# Patient Record
Sex: FEMALE | Race: WHITE | NOT HISPANIC OR LATINO | URBAN - METROPOLITAN AREA
[De-identification: names, ages, dates, MRNs, and addresses within clinical notes are randomized per-mention and may not be internally consistent; named-entity substitution may affect disease eponyms.]

---

## 2017-07-24 ENCOUNTER — OFFICE VISIT - RIVER FALLS (OUTPATIENT)
Dept: FAMILY MEDICINE | Facility: CLINIC | Age: 47
End: 2017-07-24

## 2017-07-26 LAB
CREAT SERPL-MCNC: 0.83 MG/DL (ref 0.5–1.1)
GLUCOSE BLD-MCNC: 87 MG/DL (ref 65–99)

## 2018-08-14 ENCOUNTER — OFFICE VISIT - RIVER FALLS (OUTPATIENT)
Dept: FAMILY MEDICINE | Facility: CLINIC | Age: 48
End: 2018-08-14

## 2018-08-14 ENCOUNTER — AMBULATORY - RIVER FALLS (OUTPATIENT)
Dept: FAMILY MEDICINE | Facility: CLINIC | Age: 48
End: 2018-08-14

## 2018-08-14 ASSESSMENT — MIFFLIN-ST. JEOR: SCORE: 1264.97

## 2018-08-15 LAB
CREAT SERPL-MCNC: 0.88 MG/DL (ref 0.5–1.1)
GLUCOSE BLD-MCNC: 91 MG/DL (ref 65–99)

## 2022-02-11 VITALS
DIASTOLIC BLOOD PRESSURE: 90 MMHG | SYSTOLIC BLOOD PRESSURE: 146 MMHG | HEART RATE: 76 BPM | WEIGHT: 152 LBS | BODY MASS INDEX: 26.93 KG/M2

## 2022-02-11 VITALS
SYSTOLIC BLOOD PRESSURE: 122 MMHG | HEIGHT: 63 IN | BODY MASS INDEX: 26.79 KG/M2 | TEMPERATURE: 97.4 F | DIASTOLIC BLOOD PRESSURE: 86 MMHG | HEART RATE: 64 BPM | WEIGHT: 151.2 LBS

## 2022-02-16 NOTE — PROGRESS NOTES
Patient:   DONALDO HUTCHINSON            MRN: [id number]            FIN: [id number]               Age:   48 years     Sex:  Female     :  1970   Associated Diagnoses:   Hypertensive disorder   Author:   Aubree Loomis      Chief Complaint   2018 8:16 AM CDT    HTN med check/refill        History of Present Illness   confirmed chief complaint with patient  She is doing great  needs BMP for hx of HTN despite efforts at diet and exercise and wt control  She runs low potassium at times and has OTC supplement she takes prn when she feels 'low energy'; level normal last year  BP controlled  non smoker  will schedule mammogram         Review of Systems             Health Status   Allergies:    Allergic Reactions (Selected)  No known allergies   Medications:  (Selected)   Prescriptions  Prescribed  chlorthalidone 25 mg oral tablet: See Instructions, Instructions: TAKE 1/2 TABLET BY MOUTH DAILY, # 45 tab(s), 4 Refill(s), Type: Maintenance, Pharmacy: [facility] [id number], TAKE 1/2 TABLET BY MOUTH DAILY  lisinopril 10 mg oral tablet: 1 tab(s) ( 10 mg ), Oral, daily, # 90 tab(s), 4 Refill(s), Type: Maintenance, Pharmacy: [facility] [id number], 1 tab(s) Oral daily  Documented Medications  Documented  ParaGard intrauteral device: Instructions: placed 8 years ago, 0 Refill(s), Type: Maintenance   Problem list:    All Problems  Hypertensive disorder / SNOMED CT 8110214153 / Confirmed  Toxic shock syndrome / SNOMED CT 0218494589 / Confirmed  Allergic rhinitis / SNOMED CT 938373132 / Confirmed  Adopted / SNOMED CT 498232622 / Confirmed  Resolved: Pregnancy / SNOMED CT 165789202  Resolved: Pregnancy / SNOMED CT 029538701  Resolved: Pregnancy / SNOMED CT 990050098      Histories   Past Medical History:    Active  Toxic shock syndrome (7357908895): Onset in  at 19 years.  Hypertensive disorder (3421926694)  Allergic rhinitis (865978843)  Adopted (261224392)  Resolved  Pregnancy (459179690): Onset on 2003  at 33 years.  Resolved on 3/12/2004 at 33 years.  Pregnancy (936156576): Onset on 1/9/1999 at 28 years.  Resolved on 10/2/1999 at 29 years.  Pregnancy (542770624): Onset on 9/25/1995 at 25 years.  Resolved on 4/1/1996 at 26 years.   Family History:    Patient was adopted.   High blood pressure  Mother  Grandmother (P)  Diabetes..  Mother  Grandfather (M)     Procedure history:    Bilateral reduction mammoplasty (SNOMED CT 052960273) performed by Pete aCstro MD on 10/3/2016 at 46 Years.  Dilation and curettage (SNOMED CT 14414465) in 1986 at 16 Years.  Comments:  9/22/2016 11:11 AM - Stacy Soria  With miscarriage.  Arthroscopy of knee (SNOMED CT 159361220) in 1985 at 15 Years.  Comments:  9/22/2016 11:21 AM Stacy Nam  Right knee reconstruction.  Right knee surgery in 1983 at 13 Years.  Comments:  9/22/2016 11:13 Stacy Wilkerson  x 2  Adenoidectomy (SNOMED CT 071921094).   Social History:        Alcohol Assessment            Current, 3-5 times per week, 3 drinks/episode average.      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  Spouse/Partner name: Ministerio.  2 children.      Exercise and Physical Activity Assessment            Exercise frequency: 5-6 times/week.  Exercise type: Running, Walking.                     Comments:                      01/22/2016 - Hanane Coreas RN      Sexual Assessment            Sexually active: Yes.  Sexual orientation: Heterosexual.  Uses condoms: No.  Contraceptive Use Details:               Intrauterine device.        Physical Examination   Vital Signs   8/14/2018 8:16 AM CDT Temperature Tympanic 97.4 DegF  LOW    Peripheral Pulse Rate 64 bpm    Pulse Site Radial artery    HR Method  Manual    Systolic Blood Pressure 122 mmHg    Diastolic Blood Pressure 86 mmHg  HI    Mean Arterial Pressure 98 mmHg    BP Site Right arm    BP Method Manual      Measurements from flowsheet : Measurements   8/14/2018 8:16 AM CDT Height Measured - Standard 63 in    Weight Measured - Standard 151.2 lb    BSA 1.74 m2    Body Mass Index 26.78 kg/m2  HI      General:  Alert and oriented, No acute distress.    Neck:  Supple, Non-tender, No lymphadenopathy, No thyromegaly.    Respiratory:  Lungs are clear to auscultation, Respirations are non-labored, Breath sounds are equal.    Cardiovascular:  Normal rate, Regular rhythm, No murmur, Normal peripheral perfusion, No edema.    Integumentary:  Warm, Dry, Pink.       Impression and Plan   Diagnosis     Hypertensive disorder ([id number]).     Patient Instructions:       Counseled: Patient, Regarding diagnosis, Regarding treatment, Regarding medications, Verbalized understanding.    Orders     Orders (Selected)   Outpatient Orders  Ordered (Dispatched)  Basic Metabolic Panel* ([facility]): Specimen Type: Serum, Collection Date: 08/14/18 8:40:00 CDT  Prescriptions  Prescribed  chlorthalidone 25 mg oral tablet: See Instructions, Instructions: TAKE 1/2 TABLET BY MOUTH DAILY, # 45 tab(s), 4 Refill(s), Type: Maintenance, Pharmacy: [id number] [id number], TAKE 1/2 TABLET BY MOUTH DAILY  lisinopril 10 mg oral tablet: 1 tab(s) ( 10 mg ), Oral, daily, # 90 tab(s), 4 Refill(s), Type: Maintenance, Pharmacy: [facility] [id number], 1 tab(s) Oral daily.

## 2022-02-16 NOTE — LETTER
(Inserted Image. Unable to display)       September 05, 2019      DONALDO HUTCHINSON  [address] [place], [place] [id number]          Dear DONALDO,      Thank you for selecting [facility] for your healthcare needs.     Our records indicate you are due for the following services:    Fasting Lab Tests ~ Please do not eat or drink anything 10 hours prior to your scheduled appointment time.  (Water and any medications that you may need are allowed unless directed otherwise.)    If you had your labs done at another facility or with [facility] Lab Testing at [facility], please bring in a copy of the results to your next visit, mail a copy, or drop off a copy of your results to your Healthcare Provider.    Medication Check  Hypertension Check ~ Please remember to bring any at-home blood pressure readings with you to your appointment.    You are due for lab work and an office visit; please schedule the lab appointment 1 week before the office visit.  This will assure all results are available to discuss with your Healthcare Provider during your visit.    **It is very helpful if you bring your medication bottles to your appointment.  This assures we have all of your current medications, including strength and dosing information, documented accurately in your medical record.    To schedule an appointment or if you have further questions, please contact your primary clinic:   [facility]       [phone]   [facility]       [phone]              [facility]     [phone]    Powered by [employer] and [employer]    Sincerely,    JOSE Blackmon

## 2022-02-16 NOTE — TELEPHONE ENCOUNTER
Entered by Yesica Bedolla CMA on September 03, 2019 12:31:11 PM CDT  Date of last office visit and reason:  8/14/18 HTN      Date of last Med Check / Px:   _  Date of last labs pertaining to med:  BMP 8/14/18     RTC order in chart:  Due back last month. RTC placed. One month protocol today    For Protocol refill, has patient been contacted:  Letter/Message      ------------------------------------------  From: [facility] #[id number]  To: Aubree Loomis  Sent: August 29, 2019 7:46:03 PM CDT  Subject: Medication Management  Due: August 30, 2019 7:46:03 PM CDT    ** On Hold Pending Signature **  Drug: chlorthalidone (chlorthalidone 25 mg oral tablet)  TAKE 1/2 TABLET BY MOUTH DAILY  Quantity: 45 unknown unit  Days Supply: 0         Refills: 3  Substitutions Allowed  Notes from Pharmacy:     Dispensed Drug: chlorthalidone (chlorthalidone 25 mg oral tablet)  TAKE 1/2 TABLET BY MOUTH DAILY  Quantity: 45 tab(s)     Days Supply: 90        Refills: 0  Substitutions Allowed  Notes from Pharmacy:     ** On Hold Pending Signature **  Drug: lisinopril (lisinopril 10 mg oral tablet)  1 TAB(S) ORAL DAILY  Quantity: 90 unknown unit  Days Supply: 0         Refills: 3  Substitutions Allowed  Notes from Pharmacy:     Dispensed Drug: lisinopril (lisinopril 10 mg oral tablet)  TAKE 1 TABLET BY MOUTH DAILY  Quantity: 90 tab(s)     Days Supply: 90        Refills: 0  Substitutions Allowed  Notes from Pharmacy:   ------------------------------------------  ** Submitted: **  Order:chlorthalidone (chlorthalidone 25 mg oral tablet)  0.5 tab(s)  Oral  daily  Qty:  15 tab(s)        Refills:  0          Substitutions Allowed     Route To Pharmacy - [facility] #[id number]    Signed by Yesica Bedolla CMA  9/3/2019 12:31:00 PM    ** Submitted: **  Complete:chlorthalidone (chlorthalidone 25 mg oral tablet)   Signed by Yesica Bedolla CMA  9/3/2019 12:31:00 PM    ** Not Approved:  **  chlorthalidone (CHLORTHALIDONE 25MG TABLETS)  TAKE  1/2 TABLET BY MOUTH DAILY  Qty:  45 tab(s)        Days Supply:  90        Refills:  0          Substitutions Allowed     Route To [facility] #[id number]   Signed by Yesica Bedolla CMA---------------------  From: Yesica Bedolla CMA   To: [facility] #[id number]    Sent: 9/3/2019 12:31:53 PM CDT  Subject: Medication Management     ** Submitted: **  Order:lisinopril (lisinopril 10 mg oral tablet)  1 tab(s)  Oral  daily  Qty:  30 tab(s)        Refills:  0          Substitutions Allowed     Route To [facility] #[id number]    Signed by Yesica Bedolla CMA  9/3/2019 12:31:00 PM    ** Submitted: **  Complete:lisinopril (lisinopril 10 mg oral tablet)   Signed by Yesica Bedolla CMA  9/3/2019 12:31:00 PM    ** Not Approved:  **  lisinopril (LISINOPRIL 10MG TABLETS)  TAKE 1 TABLET BY MOUTH DAILY  Qty:  90 tab(s)        Days Supply:  90        Refills:  0          Substitutions Allowed     Route To [facility] #[id number]   Signed by Yesica Bedolla CMA

## 2022-02-16 NOTE — PROGRESS NOTES
Patient:   DONALDO HUTCHINSON            MRN: [id number]            FIN: [id number]               Age:   47 years     Sex:  Female     :  1970   Associated Diagnoses:   None   Author:   Iman Smith      Chief Complaint   2017 6:41 PM CDT    HTN f/u and refills.        History of Present Illness   PPC for med refill.  has been out of antihypertensive for one week,  last BMP 2016, hx of hypokalemia  would like to check this today  no chest pain, dyspnea, or syncope, no edema or muscle fatigue  last pap 2016  saw Dr Castro 2 weeks ago for follow up from breast augmentation, recommended no mammogram until 18 months post op  this will put us around spring 2018      Review of Systems   Constitutional:  Negative.    Eye:  Negative.    Ear/Nose/Mouth/Throat:  Negative.    Respiratory:  Negative.    Cardiovascular:  Negative except as documented in history of present illness.    Endocrine:  Negative.    Musculoskeletal:  Negative.    Integumentary:  Negative.    Neurologic:  Negative.    Psychiatric:  Negative.             Health Status   Allergies:    Allergic Reactions (Selected)  No known allergies   Medications:  (Selected)   Prescriptions  Prescribed  chlorthalidone 25 mg oral tablet: 0.5 tab, po, daily, # 45 tab(s), 3 Refill(s), Type: Maintenance, Pharmacy: [facility] [id number], 0.5 tab po daily,x90 day(s)  lisinopril 10 mg oral tablet: 1 tab(s) ( 10 mg ), po, daily, # 90 tab(s), 3 Refill(s), Type: Maintenance, Pharmacy: [facility] [id number], 1 tab(s) po daily  Documented Medications  Documented  ParaGard intrauteral device: Instructions: placed 8 years ago, 0 Refill(s), Type: Maintenance   Problem list:    All Problems (Selected)  Adopted / SNOMED CT 483826881 / Confirmed  Allergic rhinitis / SNOMED CT 726155180 / Confirmed  Hypertensive disorder / SNOMED CT 7722598115 / Confirmed  Toxic shock syndrome / SNOMED CT 0493398789 / Confirmed      Histories   Family History:    Patient was  adopted.   High blood pressure  Mother  Grandmother (P)  Diabetes..  Mother  Grandfather (M)        Physical Examination   Vital Signs   7/24/2017 6:41 PM CDT Peripheral Pulse Rate 76 bpm    Pulse Site Radial artery    HR Method Manual    Systolic Blood Pressure 146 mmHg  HI    Diastolic Blood Pressure 90 mmHg    Mean Arterial Pressure 109 mmHg    BP Site Right arm    BP Method Manual      Measurements from flowsheet : Measurements   7/24/2017 6:41 PM CDT    Weight Measured - Standard                152 lb     General:  Alert and oriented, No acute distress.    Eye:  Pupils are equal, round and reactive to light, Extraocular movements are intact.    HENT:  Normocephalic, Tympanic membranes are clear, No pharyngeal erythema.    Neck:  Supple, Non-tender, No carotid bruit, No jugular venous distention, No lymphadenopathy, No thyromegaly.    Respiratory:  Lungs are clear to auscultation, Respirations are non-labored.    Cardiovascular:  Normal rate, Regular rhythm, No edema.    Musculoskeletal:  Normal range of motion.    Integumentary:  Warm, Dry, Pink.    Neurologic:  Alert, Oriented, Normal sensory.    Psychiatric:  Cooperative, Appropriate mood & affect, Normal judgment.       Review / Management   Results review:  bmp pending.       Impression and Plan   Patient Instructions:       Counseled: Patient, Regarding diagnosis, Regarding treatment, Regarding medications, Verbalized understanding.    Orders     Orders (Selected)   Prescriptions  Prescribed  chlorthalidone 25 mg oral tablet: 0.5 tab, po, daily, # 45 tab(s), 3 Refill(s), Type: Maintenance, Pharmacy: [facility] [id number], 0.5 tab po daily,x90 day(s)  lisinopril 10 mg oral tablet: 1 tab(s) ( 10 mg ), po, daily, # 90 tab(s), 3 Refill(s), Type: Maintenance, Pharmacy: [facility] [id number], 1 tab(s) po daily.